# Patient Record
Sex: FEMALE | Race: WHITE | URBAN - METROPOLITAN AREA
[De-identification: names, ages, dates, MRNs, and addresses within clinical notes are randomized per-mention and may not be internally consistent; named-entity substitution may affect disease eponyms.]

---

## 2019-12-02 PROBLEM — Z98.84 HISTORY OF GASTRIC RESTRICTIVE SURGERY: Status: ACTIVE | Noted: 2019-12-02

## 2019-12-02 PROBLEM — Z97.5 NEXPLANON IN PLACE: Status: ACTIVE | Noted: 2019-12-02

## 2019-12-04 PROBLEM — G43.909 MIGRAINE: Status: ACTIVE | Noted: 2019-12-04

## 2019-12-04 PROBLEM — D25.9 UTERINE FIBROID: Status: ACTIVE | Noted: 2019-12-04

## 2020-01-13 PROBLEM — J39.8 TRACHEAL/BRONCHIAL DISEASE: Status: ACTIVE | Noted: 2017-06-02

## 2020-01-13 PROBLEM — J98.09 TRACHEAL/BRONCHIAL DISEASE: Status: ACTIVE | Noted: 2017-06-02

## 2020-01-13 PROBLEM — G89.29 CHRONIC PAIN: Status: ACTIVE | Noted: 2020-01-13

## 2020-01-13 PROBLEM — J01.00 ACUTE NON-RECURRENT MAXILLARY SINUSITIS: Status: ACTIVE | Noted: 2017-12-27

## 2020-01-13 PROBLEM — E66.01 MORBID OBESITY WITH BODY MASS INDEX OF 40.0-44.9 IN ADULT (HCC): Status: ACTIVE | Noted: 2020-01-13
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
## 2020-01-13 PROBLEM — Z98.890 HISTORY OF CERVICAL SPINAL SURGERY: Status: ACTIVE | Noted: 2017-06-02

## 2022-08-30 PROBLEM — J01.00 ACUTE NON-RECURRENT MAXILLARY SINUSITIS: Status: RESOLVED | Noted: 2017-12-27 | Resolved: 2022-08-30
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
## 2022-08-31 ENCOUNTER — OFFICE VISIT (OUTPATIENT)
Dept: OBGYN CLINIC | Facility: CLINIC | Age: 37
End: 2022-08-31
Payer: MEDICAID

## 2022-08-31 VITALS
WEIGHT: 255.38 LBS | BODY MASS INDEX: 40.08 KG/M2 | DIASTOLIC BLOOD PRESSURE: 76 MMHG | HEIGHT: 67 IN | SYSTOLIC BLOOD PRESSURE: 118 MMHG | HEART RATE: 80 BPM

## 2022-08-31 DIAGNOSIS — Z01.419 ENCOUNTER FOR GYNECOLOGICAL EXAMINATION WITHOUT ABNORMAL FINDING: Primary | ICD-10-CM

## 2022-08-31 DIAGNOSIS — B00.9 DISEASE OF GINGIVA DUE TO RECURRENT ORAL HERPES SIMPLEX VIRUS (HSV) INFECTION: ICD-10-CM

## 2022-08-31 DIAGNOSIS — Z12.4 SCREENING FOR CERVICAL CANCER: ICD-10-CM

## 2022-08-31 DIAGNOSIS — K06.9 DISEASE OF GINGIVA DUE TO RECURRENT ORAL HERPES SIMPLEX VIRUS (HSV) INFECTION: ICD-10-CM

## 2022-08-31 PROCEDURE — 3008F BODY MASS INDEX DOCD: CPT | Performed by: OBSTETRICS & GYNECOLOGY

## 2022-08-31 PROCEDURE — 87624 HPV HI-RISK TYP POOLED RSLT: CPT | Performed by: OBSTETRICS & GYNECOLOGY

## 2022-08-31 PROCEDURE — 3078F DIAST BP <80 MM HG: CPT | Performed by: OBSTETRICS & GYNECOLOGY

## 2022-08-31 PROCEDURE — 99385 PREV VISIT NEW AGE 18-39: CPT | Performed by: OBSTETRICS & GYNECOLOGY

## 2022-08-31 PROCEDURE — 3074F SYST BP LT 130 MM HG: CPT | Performed by: OBSTETRICS & GYNECOLOGY

## 2022-08-31 RX ORDER — VALACYCLOVIR HYDROCHLORIDE 500 MG/1
500 TABLET, FILM COATED ORAL DAILY
Qty: 90 TABLET | Refills: 3 | Status: SHIPPED | OUTPATIENT
Start: 2022-08-31

## 2022-09-01 LAB — HPV I/H RISK 1 DNA SPEC QL NAA+PROBE: NEGATIVE

## 2023-03-02 ENCOUNTER — OFFICE VISIT (OUTPATIENT)
Dept: OBGYN CLINIC | Facility: CLINIC | Age: 38
End: 2023-03-02
Payer: MEDICAID

## 2023-03-02 VITALS
DIASTOLIC BLOOD PRESSURE: 76 MMHG | HEART RATE: 71 BPM | BODY MASS INDEX: 41 KG/M2 | WEIGHT: 261.81 LBS | SYSTOLIC BLOOD PRESSURE: 112 MMHG

## 2023-03-02 DIAGNOSIS — Z30.430 ENCOUNTER FOR INSERTION OF INTRAUTERINE CONTRACEPTIVE DEVICE (IUD): ICD-10-CM

## 2023-03-02 DIAGNOSIS — Z30.46 ENCOUNTER FOR REMOVAL OF SUBDERMAL CONTRACEPTIVE IMPLANT: Primary | ICD-10-CM

## 2023-03-02 DIAGNOSIS — Z01.812 PRE-PROCEDURAL LABORATORY EXAMINATION: ICD-10-CM

## 2023-03-02 PROBLEM — Z97.5 NEXPLANON IN PLACE: Status: RESOLVED | Noted: 2019-12-02 | Resolved: 2023-03-02

## 2023-03-02 PROBLEM — Z97.5 IUD (INTRAUTERINE DEVICE) IN PLACE: Status: ACTIVE | Noted: 2023-03-02

## 2023-03-02 LAB
CONTROL LINE PRESENT WITH A CLEAR BACKGROUND (YES/NO): YES YES/NO
KIT LOT #: NORMAL NUMERIC

## 2023-03-02 PROCEDURE — 3078F DIAST BP <80 MM HG: CPT | Performed by: OBSTETRICS & GYNECOLOGY

## 2023-03-02 PROCEDURE — 3074F SYST BP LT 130 MM HG: CPT | Performed by: OBSTETRICS & GYNECOLOGY

## 2023-03-02 PROCEDURE — 81025 URINE PREGNANCY TEST: CPT | Performed by: OBSTETRICS & GYNECOLOGY

## 2023-03-02 PROCEDURE — 11982 REMOVE DRUG IMPLANT DEVICE: CPT | Performed by: OBSTETRICS & GYNECOLOGY

## 2023-03-02 PROCEDURE — 58300 INSERT INTRAUTERINE DEVICE: CPT | Performed by: OBSTETRICS & GYNECOLOGY

## 2023-03-02 RX ORDER — METHYLPREDNISOLONE 4 MG/1
TABLET ORAL
COMMUNITY
Start: 2023-01-28

## 2023-03-02 RX ORDER — ALBUTEROL SULFATE 90 UG/1
2 AEROSOL, METERED RESPIRATORY (INHALATION) EVERY 4 HOURS PRN
COMMUNITY
Start: 2022-12-30

## 2023-03-02 RX ORDER — TIRZEPATIDE 2.5 MG/.5ML
INJECTION, SOLUTION SUBCUTANEOUS
COMMUNITY
Start: 2023-01-27

## 2023-03-02 RX ORDER — BIMATOPROST 3 UG/ML
SOLUTION TOPICAL
COMMUNITY
Start: 2023-02-21

## 2023-03-02 NOTE — PROCEDURES
Nexplanon Removal Procedure Note    Post-Operative Diagnosis and Indication: Desires removal    Procedure Details: The risks including infection, scarring, bleeding and difficulty with removal were explained to the patient and verbal informed consent obtained. Procedure performed under sterile conditions with betadine prep. Incision site marked at distal tip of implant on left arm and area infiltrated with 1% lidocaine solution. Small stab incision made and nexplanon device removed intact without difficulty using earl clamp. Bacitracin ointment, steri strips, adhesive bandage and wrap bandage applied. Condition:  Stable    Complications:  None    Plan:  The patient was advised to call for any fever, redness, bruising, discharge or worsening pain. Follow up as needed or for routine gynecologic examination.

## 2023-03-02 NOTE — PROCEDURES
IUD Insertion Procedure Note    Indications: Desires contraception    Procedure Details:   Urine pregnancy test negative. No contraindications. The risks including infection with effects on future fertility and need for antibiotics, bleeding, expulsion, and uterine perforation with need for additional surgery and benefits of the procedure were explained to the patient and informed consent obtained. Bimanual examination was performed and the uterus noted to be anteverted. The cervix was prepped with betadine solution. Benzocaine gel 20% applied to anterior cervix. The uterus was sounded to 9 cms. A new Mirena IUD was inserted without complications using correct insertion technique. The strings were trimmed to 3.5 cms. Signs and symptoms of complications reviewed and patient was given the patient information pamphlet. Condition:  Stable    Complications:  None    Plan:  The patient was advised to call for any fever or for prolonged or severe pain or bleeding. Follow up one month for IUD check, and refrain for intercourse until next visit. 's information booklet given to patient. Card with insertion and removal date also given to patient.

## 2023-12-20 ENCOUNTER — V-VISIT (OUTPATIENT)
Dept: URGENT CARE | Age: 38
End: 2023-12-20

## 2023-12-20 VITALS
OXYGEN SATURATION: 98 % | DIASTOLIC BLOOD PRESSURE: 78 MMHG | SYSTOLIC BLOOD PRESSURE: 116 MMHG | HEART RATE: 88 BPM | RESPIRATION RATE: 16 BRPM | BODY MASS INDEX: 42.12 KG/M2 | TEMPERATURE: 97.8 F | WEIGHT: 277.89 LBS | HEIGHT: 68 IN

## 2023-12-20 DIAGNOSIS — J01.90 ACUTE BACTERIAL SINUSITIS: Primary | ICD-10-CM

## 2023-12-20 DIAGNOSIS — B96.89 ACUTE BACTERIAL SINUSITIS: Primary | ICD-10-CM

## 2023-12-20 PROCEDURE — 99203 OFFICE O/P NEW LOW 30 MIN: CPT | Performed by: NURSE PRACTITIONER

## 2023-12-20 RX ORDER — DOXYCYCLINE HYCLATE 100 MG
100 TABLET ORAL 2 TIMES DAILY
Qty: 14 TABLET | Refills: 0 | Status: SHIPPED | OUTPATIENT
Start: 2023-12-20 | End: 2023-12-27

## 2023-12-20 ASSESSMENT — ENCOUNTER SYMPTOMS
EYES NEGATIVE: 1
DIZZINESS: 1
PSYCHIATRIC NEGATIVE: 1
HEMATOLOGIC/LYMPHATIC NEGATIVE: 1
GASTROINTESTINAL NEGATIVE: 1
SINUS PRESSURE: 1
ENDOCRINE NEGATIVE: 1
CONSTITUTIONAL NEGATIVE: 1
RESPIRATORY NEGATIVE: 1
SINUS PAIN: 1
HEADACHES: 1
ALLERGIC/IMMUNOLOGIC NEGATIVE: 1

## 2024-04-30 ENCOUNTER — APPOINTMENT (OUTPATIENT)
Dept: URGENT CARE | Age: 39
End: 2024-04-30

## 2024-10-07 DIAGNOSIS — K06.9 DISEASE OF GINGIVA DUE TO RECURRENT ORAL HERPES SIMPLEX VIRUS (HSV) INFECTION: ICD-10-CM

## 2024-10-07 DIAGNOSIS — B00.9 DISEASE OF GINGIVA DUE TO RECURRENT ORAL HERPES SIMPLEX VIRUS (HSV) INFECTION: ICD-10-CM

## 2024-10-07 RX ORDER — VALACYCLOVIR HYDROCHLORIDE 500 MG/1
500 TABLET, FILM COATED ORAL DAILY
Qty: 90 TABLET | Refills: 3 | OUTPATIENT
Start: 2024-10-07

## 2024-10-07 NOTE — TELEPHONE ENCOUNTER
Last annual exam: 8/31/2022  Follow-up recommended: RTC in 1 year  Last refill date:   Medication Quantity Refills Start End   valACYclovir (VALTREX) 500 MG Oral Tab 90 tablet 3 8/31/2022 --   Sig:   Take 1 tablet (500 mg total) by mouth daily.       Next appt.: none  Refill denied.  [facility]
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
## 2024-11-01 ENCOUNTER — OFFICE VISIT (OUTPATIENT)
Dept: OBGYN CLINIC | Facility: CLINIC | Age: 39
End: 2024-11-01
Payer: MEDICAID

## 2024-11-01 ENCOUNTER — TELEPHONE (OUTPATIENT)
Dept: OBGYN CLINIC | Facility: CLINIC | Age: 39
End: 2024-11-01

## 2024-11-01 VITALS
WEIGHT: 278.81 LBS | HEART RATE: 90 BPM | DIASTOLIC BLOOD PRESSURE: 82 MMHG | SYSTOLIC BLOOD PRESSURE: 126 MMHG | BODY MASS INDEX: 44 KG/M2

## 2024-11-01 DIAGNOSIS — Z01.419 ENCOUNTER FOR GYNECOLOGICAL EXAMINATION WITHOUT ABNORMAL FINDING: Primary | ICD-10-CM

## 2024-11-01 DIAGNOSIS — D25.9 UTERINE LEIOMYOMA, UNSPECIFIED LOCATION: ICD-10-CM

## 2024-11-01 PROCEDURE — 99395 PREV VISIT EST AGE 18-39: CPT | Performed by: OBSTETRICS & GYNECOLOGY

## 2024-11-01 NOTE — TELEPHONE ENCOUNTER
Central Scheduling for [facility] request US to be faxed for patient. Faxed to [phone] rec'd conf #[id number]   Tazorac Counseling:  Patient advised that medication is irritating and drying.  Patient may need to apply sparingly and wash off after an hour before eventually leaving it on overnight.  The patient verbalized understanding of the proper use and possible adverse effects of tazorac.  All of the patient's questions and concerns were addressed.

## 2024-11-01 NOTE — PROGRESS NOTES
Subjective:  Chief Complaint   Patient presents with    Physical     Annual exam      39 year old female who presents for annual well woman visit without complaints.  No menses with IUD.    No LMP recorded. (Menstrual status: IUD - Intrauterine Device).  Hx Prior Abnormal Pap: Yes  Pap Date: 08/31/22  Pap Result Notes: WNL  Menarche: 15 (11/1/2024 10:21 AM)  Period Cycle (Days): No periods due to IUD (11/1/2024 10:21 AM)  Period Duration (Days): N/A (11/1/2024 10:21 AM)  Period Flow: N/A (11/1/2024 10:21 AM)  Use of Birth Control (if yes, specify type): Mirena IUD (11/1/2024 10:21 AM)  Hx Prior Abnormal Pap: Yes (11/1/2024 10:21 AM)  Pap Date: 08/31/22 (11/1/2024 10:21 AM)  Pap Result Notes: WNL (11/1/2024 10:21 AM)      Last Pap smear: 8/2022     Abnormal Pap: n    Pelvic Infections/STD: None  Contraception: IUD    Review of Systems:  Pertinent items are noted in the HPI.    Patient History:  New Medical Illness: None  New Surgeries: None  New Family History: None   reports that she has never smoked. She has never used smokeless tobacco.   reports that she does not currently use alcohol.    Most Recent Immunizations   Administered Date(s) Administered    Covid-19 Vaccine Pfizer 30 mcg/0.3 ml 05/24/2021    FLUZONE 6 months and older PFS 0.5 ml (82582) 12/02/2019       Objective:  /82   Pulse 90   Wt 278 lb 12.8 oz (126.5 kg)   Physical Examination:  General appearance: Well dressed, well nourished in no apparent distress  Neurologic/Psychiatric: Alert and oriented to person, place and time, mood normal, affect appropriate  Head: Normocephalic without obvious deformity, atraumatic  Neck: No thyromegaly, supple, non-tender, no masses, no adenopathy  Lungs: Clear to auscultation bilaterally, no rales, wheezes or rhonchi  Breasts: Symmetric, non-tender, no masses, lesions, retraction, dimpling or discharge bilaterally, no axillary or supraclavicular lymphadenopathy  Heart:: Regular rate and rhythm, no gallops or  murmurs  Abdomen: Soft, non-tender, non-distended, no masses, no hepatosplenomegaly, no hernias, no inguinal lymphadenopathy  Pelvic:    External genitalia- Normal, Bartholin's, urethra, skeins glands normal   Vagina- No vaginal lesions, no discharge   Cervix- No lesions, long/closed, no cervical motion tenderness, IUD strings present   Uterus- 13 week sized, anteverted, non-tender, no masses   Adnexa-  Non-tender, no masses  Extremities: Non-tender, full range of motion, no clubbing, cyanosis or edema  Skin:  General inspection- no rashes, lesions or discoloration  Rectum: No hemorrhoids, no masses.    Assessment/Plan:  Normal well-woman exam.  Yearly screening mammogram starting next year.  Enlarged uterus- check pelvic ultrasound.    Patient offered chaperone for exam, declined    Diagnoses and all orders for this visit:    Encounter for gynecological examination without abnormal finding    Uterine leiomyoma, unspecified location  -     US PELVIS W EV (CPT=76856/80998); Future      Return in about 1 year (around 11/1/2025) for Annual Well Woman Exam.

## 2024-12-09 ENCOUNTER — TELEPHONE (OUTPATIENT)
Dept: OBGYN CLINIC | Facility: CLINIC | Age: 39
End: 2024-12-09

## 2024-12-09 NOTE — TELEPHONE ENCOUNTER
Pt needs call back from nurses or Dr on the test results from ultrasound she had at  [facility] on 12/04

## 2024-12-09 NOTE — TELEPHONE ENCOUNTER
Pt requesting US results done at [facility] on 12/4, Results in Care Everywhere.      ltrasound Pelvis TV WO Ovarian Doppler    Anatomical Region Laterality Modality   Pelvis -- Ultrasound   Abdomen -- --     Narrative    US PELVIS TV WO OVARIAN DOPPLER    HISTORY: Uterine leiomyoma, unspecified location    COMPARISON: None    FINDINGS:    Uterus measures 8.7 x 5.2 x 4.8 cm and is anteverted.    There is a intramural posteriorly located fibroid at the uterine body measuring 16 x 14 x 17 mm.    Endometrium measures 1 mm in sagittal thickness and appears grossly uniform in echogenicity, although the presence of the IUD somewhat limits evaluation of the endometrium.    Cervix: Nabothian cysts.    Right ovary: Right ovary is not visualized, which may be secondary to overlying bowel gas.    Left ovary: Left ovary measures 59 x 26 x 33 mm  and contains a complex cyst with thick wall and centrally complex cystic component. Overall this complex structure measures approximately 23 x 15 x 21 mm.    Free Fluid: None.    IMPRESSION: As Above.    PERFORMING TECHNOLOGIST: CHRISTIANO SAMSON      FINAL REPORT  Attending Radiologist:  Jim Lovelace MD  Date Signed Off:  12/04/2024 11:00  Procedure Note    Jim Lovelace MD - 12/04/2024  Formatting of this note might be different from the original.  US PELVIS TV WO OVARIAN DOPPLER    HISTORY: Uterine leiomyoma, unspecified location    COMPARISON: None    FINDINGS:    Uterus measures 8.7 x 5.2 x 4.8 cm and is anteverted.    There is a intramural posteriorly located fibroid at the uterine body measuring 16 x 14 x 17 mm.    Endometrium measures 1 mm in sagittal thickness and appears grossly uniform in echogenicity, although the presence of the IUD somewhat limits evaluation of the endometrium.    Cervix: Nabothian cysts.    Right ovary: Right ovary is not visualized, which may be secondary to overlying bowel gas.    Left ovary: Left ovary measures 59 x 26 x 33 mm  and contains a complex cyst  with thick wall and centrally complex cystic component. Overall this complex structure measures approximately 23 x 15 x 21 mm.    Free Fluid: None.    IMPRESSION: As Above.    PERFORMING TECHNOLOGIST: CHRISTIANO SAMSON      FINAL REPORT  Attending Radiologist:  Jim Lovelace MD  Date Signed Off:  12/04/2024 11:00  Exam End: 12/04/24  9:01 AM    Specimen Collected: 12/04/24 10:57 AM Last Resulted: 12/04/24 11:00 AM   Received From: [facility]  Result Received: 12/09/24  3:42 PM

## 2024-12-11 DIAGNOSIS — N83.202 LEFT OVARIAN CYST: Primary | ICD-10-CM

## 2024-12-11 NOTE — TELEPHONE ENCOUNTER
The records/report has been reviewed.  Normal uterus with one small fibroid.  Recommend 2-3 months follow up for ovarian cyst.  Patient notified and order faxed to [facility]

## 2025-01-30 NOTE — TELEPHONE ENCOUNTER
Received ultrasound report from [facility] radiology.  Report has been placed in your in-bin, [place].